# Patient Record
Sex: FEMALE | Race: WHITE | Employment: FULL TIME | URBAN - METROPOLITAN AREA
[De-identification: names, ages, dates, MRNs, and addresses within clinical notes are randomized per-mention and may not be internally consistent; named-entity substitution may affect disease eponyms.]

---

## 2017-04-26 ENCOUNTER — OFFICE VISIT (OUTPATIENT)
Dept: OBGYN CLINIC | Facility: CLINIC | Age: 47
End: 2017-04-26

## 2017-04-26 VITALS
HEIGHT: 64 IN | TEMPERATURE: 99 F | HEART RATE: 68 BPM | WEIGHT: 158 LBS | SYSTOLIC BLOOD PRESSURE: 122 MMHG | RESPIRATION RATE: 12 BRPM | BODY MASS INDEX: 26.98 KG/M2 | DIASTOLIC BLOOD PRESSURE: 74 MMHG

## 2017-04-26 DIAGNOSIS — Z11.51 SCREENING FOR HUMAN PAPILLOMAVIRUS: ICD-10-CM

## 2017-04-26 DIAGNOSIS — Z01.419 ENCOUNTER FOR ANNUAL ROUTINE GYNECOLOGICAL EXAMINATION: Primary | ICD-10-CM

## 2017-04-26 PROCEDURE — 88175 CYTOPATH C/V AUTO FLUID REDO: CPT | Performed by: OBSTETRICS & GYNECOLOGY

## 2017-04-26 PROCEDURE — 87624 HPV HI-RISK TYP POOLED RSLT: CPT | Performed by: OBSTETRICS & GYNECOLOGY

## 2017-04-26 PROCEDURE — 99396 PREV VISIT EST AGE 40-64: CPT | Performed by: OBSTETRICS & GYNECOLOGY

## 2017-04-26 RX ORDER — LEVONORGESTREL AND ETHINYL ESTRADIOL 0.15-0.03
1 KIT ORAL DAILY
Qty: 3 PACKAGE | Refills: 3 | Status: SHIPPED | OUTPATIENT
Start: 2017-04-26 | End: 2017-05-10

## 2017-04-26 NOTE — PROGRESS NOTES
HPI:   Sangita Ngo is a 56year old  who presents for an annual gynecological exam.   Menses: Patient's last menstrual period was 2017 (exact date).  Cycle length: nl days Flow: nl         Current Outpatient Prescriptions:  Levonorgest-Eth Estr hernia  EXTREMITIES: No edema  EXTERNAL GENITALIA: Normal appearance for age, no lesions, normal hair distribution for age  URETHRA: No masses or tenderness, no prolapse  VAGINA: Normal, physiologic discharge, no lesions   CERVIX: Normal, no lesions, no ce gynecological examination  -     ThinPrep PAP Smear; Future  -     [facility] DIGITAL SCRN BILAT W/CAD (CPT=/38801); Future    Screening for human papillomavirus  -     Hpv Dna  High Risk , Thin Prep Collect;  Future    Other orders  -     Levonorgest-Eth Est

## 2017-05-10 RX ORDER — LEVONORGESTREL AND ETHINYL ESTRADIOL 0.15-0.03
KIT ORAL
Qty: 91 TABLET | Refills: 3 | Status: SHIPPED | OUTPATIENT
Start: 2017-05-10 | End: 2020-11-20

## 2018-01-10 ENCOUNTER — TELEPHONE (OUTPATIENT)
Dept: OBGYN CLINIC | Facility: CLINIC | Age: 48
End: 2018-01-10

## 2018-01-10 NOTE — TELEPHONE ENCOUNTER
Patient aware of 's recommendations, will monitor for another month and if no improvement will schedule appointment for evaluation.

## 2018-01-10 NOTE — TELEPHONE ENCOUNTER
Patient called stating she has been on the 3 month pill and had not had a period for that period of time.   However, she has started spotting and wants to know if this is normal?

## 2018-01-10 NOTE — TELEPHONE ENCOUNTER
Patient has been on her current OCP for almost couple years and has been experiencing increase amount of spotting in the past 2 months. Will check with Dr. Travis Wang and call patient back.

## 2018-07-17 RX ORDER — LEVONORGESTREL AND ETHINYL ESTRADIOL 0.15-0.03
1 KIT ORAL DAILY
Qty: 273 TABLET | Refills: 2 | OUTPATIENT
Start: 2018-07-17

## 2018-08-08 RX ORDER — LEVONORGESTREL AND ETHINYL ESTRADIOL 0.15-0.03
1 KIT ORAL DAILY
Qty: 91 TABLET | Refills: 0 | Status: SHIPPED | OUTPATIENT
Start: 2018-08-08 | End: 2018-10-12

## 2018-08-08 NOTE — TELEPHONE ENCOUNTER
Patient called back. Scheduled for a physical 8/17/2018. Last one 4/26/2017. Last mammogram 9/2016. She didn't realize it was that long ago. Asking for refill on ocp.

## 2018-08-17 ENCOUNTER — OFFICE VISIT (OUTPATIENT)
Dept: OBGYN CLINIC | Facility: CLINIC | Age: 48
End: 2018-08-17
Payer: COMMERCIAL

## 2018-08-17 VITALS
WEIGHT: 152 LBS | RESPIRATION RATE: 12 BRPM | HEART RATE: 68 BPM | TEMPERATURE: 98 F | DIASTOLIC BLOOD PRESSURE: 60 MMHG | HEIGHT: 64 IN | BODY MASS INDEX: 25.95 KG/M2 | SYSTOLIC BLOOD PRESSURE: 110 MMHG

## 2018-08-17 DIAGNOSIS — Z12.39 BREAST CANCER SCREENING: ICD-10-CM

## 2018-08-17 DIAGNOSIS — Z01.419 ENCOUNTER FOR ANNUAL ROUTINE GYNECOLOGICAL EXAMINATION: Primary | ICD-10-CM

## 2018-08-17 DIAGNOSIS — Z12.4 SCREENING FOR CERVICAL CANCER: ICD-10-CM

## 2018-08-17 DIAGNOSIS — Z11.51 SCREENING FOR HUMAN PAPILLOMAVIRUS: ICD-10-CM

## 2018-08-17 PROBLEM — G50.0 TRIGEMINAL NEURALGIA: Status: ACTIVE | Noted: 2018-08-17

## 2018-08-17 PROCEDURE — 88175 CYTOPATH C/V AUTO FLUID REDO: CPT | Performed by: OBSTETRICS & GYNECOLOGY

## 2018-08-17 PROCEDURE — 87624 HPV HI-RISK TYP POOLED RSLT: CPT | Performed by: OBSTETRICS & GYNECOLOGY

## 2018-08-17 PROCEDURE — 99396 PREV VISIT EST AGE 40-64: CPT | Performed by: OBSTETRICS & GYNECOLOGY

## 2018-08-17 NOTE — PROGRESS NOTES
HPI:   Ruth Noonan is a 53year old  who presents for an annual gynecological exam.   Menses: Patient's last menstrual period was 2018 (exact date).  Cycle length: 9 light days Flow: light   she is currently on Seasonale and feels that her flar intolerance  ALLERGIC: denies allergy symptoms    EXAM:     VITALS: /60 (BP Location: Right arm, Patient Position: Sitting, Cuff Size: adult)   Pulse 68   Temp 98.2 °F (36.8 °C) (Oral)   Resp 12   Ht 64\"   Wt 152 lb   LMP 08/09/2018 (Exact Date)   B menses. - I highly encouraged pt to be compliant with her yearly screening mammograms to maintain breast health. - I encouraged pt to have yearly annual examinations with her PCP for management of general medical problems.   - I encouraged smoking cessati

## 2018-08-20 LAB — HPV I/H RISK 1 DNA SPEC QL NAA+PROBE: NEGATIVE

## 2018-10-12 RX ORDER — LEVONORGESTREL AND ETHINYL ESTRADIOL 0.15-0.03
KIT ORAL
Qty: 91 TABLET | Refills: 3 | Status: SHIPPED | OUTPATIENT
Start: 2018-10-12 | End: 2019-09-12

## 2018-10-12 NOTE — TELEPHONE ENCOUNTER
PC with patient. Received a request for ocp from [facility]. . Called patient because the prescription is different from what was ordered. She states she has not started the generic . She is hanig jaw pain. Diagnosed with neurologia.  She notices the

## 2018-11-08 ENCOUNTER — HOSPITAL ENCOUNTER (OUTPATIENT)
Dept: MAMMOGRAPHY | Age: 48
Discharge: HOME | End: 2018-11-08
Attending: OBSTETRICS & GYNECOLOGY
Payer: COMMERCIAL

## 2018-11-08 DIAGNOSIS — Z12.39 BREAST CANCER SCREENING: ICD-10-CM

## 2018-11-08 PROCEDURE — 77067 SCR MAMMO BI INCL CAD: CPT | Performed by: OBSTETRICS & GYNECOLOGY

## 2018-11-08 PROCEDURE — 77063 BREAST TOMOSYNTHESIS BI: CPT | Performed by: OBSTETRICS & GYNECOLOGY

## 2018-11-15 ENCOUNTER — HOSPITAL ENCOUNTER (OUTPATIENT)
Dept: ULTRASOUND IMAGING | Age: 48
Discharge: HOME | End: 2018-11-15
Attending: OBSTETRICS & GYNECOLOGY
Payer: COMMERCIAL

## 2018-11-15 ENCOUNTER — HOSPITAL ENCOUNTER (OUTPATIENT)
Dept: MAMMOGRAPHY | Age: 48
Discharge: HOME | End: 2018-11-15
Attending: OBSTETRICS & GYNECOLOGY
Payer: COMMERCIAL

## 2018-11-15 DIAGNOSIS — R92.2 INCONCLUSIVE MAMMOGRAM: ICD-10-CM

## 2018-11-15 PROCEDURE — 77065 DX MAMMO INCL CAD UNI: CPT | Performed by: OBSTETRICS & GYNECOLOGY

## 2018-11-15 PROCEDURE — 76642 ULTRASOUND BREAST LIMITED: CPT | Performed by: OBSTETRICS & GYNECOLOGY

## 2018-11-15 PROCEDURE — 77061 BREAST TOMOSYNTHESIS UNI: CPT | Performed by: OBSTETRICS & GYNECOLOGY

## 2019-09-13 RX ORDER — LEVONORGESTREL AND ETHINYL ESTRADIOL 0.15-0.03
KIT ORAL
Qty: 91 TABLET | Refills: 0 | Status: SHIPPED | OUTPATIENT
Start: 2019-09-13 | End: 2019-10-11

## 2019-09-13 NOTE — TELEPHONE ENCOUNTER
PC with patient. Aware received refill for ocp. Physical is needed. Scheduled for 10/11/2019. Will refill.

## 2019-10-11 ENCOUNTER — OFFICE VISIT (OUTPATIENT)
Dept: OBGYN CLINIC | Facility: CLINIC | Age: 49
End: 2019-10-11
Payer: COMMERCIAL

## 2019-10-11 VITALS
HEIGHT: 64 IN | BODY MASS INDEX: 28 KG/M2 | DIASTOLIC BLOOD PRESSURE: 86 MMHG | WEIGHT: 164 LBS | SYSTOLIC BLOOD PRESSURE: 124 MMHG | HEART RATE: 80 BPM

## 2019-10-11 DIAGNOSIS — Z12.4 SCREENING FOR MALIGNANT NEOPLASM OF CERVIX: ICD-10-CM

## 2019-10-11 DIAGNOSIS — Z01.419 ENCOUNTER FOR ANNUAL ROUTINE GYNECOLOGICAL EXAMINATION: Primary | ICD-10-CM

## 2019-10-11 DIAGNOSIS — Z11.51 SCREENING FOR HUMAN PAPILLOMAVIRUS: ICD-10-CM

## 2019-10-11 DIAGNOSIS — Z12.31 VISIT FOR SCREENING MAMMOGRAM: ICD-10-CM

## 2019-10-11 PROCEDURE — 99396 PREV VISIT EST AGE 40-64: CPT | Performed by: OBSTETRICS & GYNECOLOGY

## 2019-10-11 PROCEDURE — 88175 CYTOPATH C/V AUTO FLUID REDO: CPT | Performed by: OBSTETRICS & GYNECOLOGY

## 2019-10-11 PROCEDURE — 87624 HPV HI-RISK TYP POOLED RSLT: CPT | Performed by: OBSTETRICS & GYNECOLOGY

## 2019-10-11 RX ORDER — GABAPENTIN 300 MG/1
CAPSULE ORAL
Refills: 5 | COMMUNITY
Start: 2019-09-21

## 2019-10-11 RX ORDER — CHLORAL HYDRATE 500 MG
1000 CAPSULE ORAL DAILY
COMMUNITY

## 2019-10-11 RX ORDER — MULTIVITAMIN
TABLET ORAL
COMMUNITY

## 2019-10-11 RX ORDER — LEVONORGESTREL AND ETHINYL ESTRADIOL 0.15-0.03
KIT ORAL
Qty: 91 TABLET | Refills: 3 | Status: SHIPPED | OUTPATIENT
Start: 2019-10-11 | End: 2020-08-11

## 2019-10-11 RX ORDER — OXCARBAZEPINE 150 MG/1
450 TABLET, FILM COATED ORAL
COMMUNITY
Start: 2019-10-10 | End: 2020-11-20

## 2019-10-11 NOTE — PROGRESS NOTES
HPI:   Natividad Robert is a 51year old  who presents for an annual gynecological exam.   Menses: No LMP recorded. (Menstrual status: Continuous Pill). Cycle length: none days Flow: none   she is taking Seasonale on a continuous basis.   Has trigemina back pain, muscle or joint aches  NEUROLOGIC: denies headaches  PSYCHIATRIC: denies depression or anxiety, mood is good  HEMATOLOGIC: denies history of anemia  ENDOCRINE: denies thyroid history, cold/heat intolerance  ALLERGIC: denies allergy symptoms    E breast exams; pt was told to perform these in the shower; she was instructed to perform these 7-10 days after her menses. - I highly encouraged pt to be compliant with her yearly screening mammograms to maintain breast health.   - I encouraged pt to have y

## 2019-11-15 ENCOUNTER — HOSPITAL ENCOUNTER (OUTPATIENT)
Dept: MAMMOGRAPHY | Age: 49
Discharge: HOME | End: 2019-11-15
Attending: OBSTETRICS & GYNECOLOGY
Payer: COMMERCIAL

## 2019-11-15 DIAGNOSIS — Z12.31 VISIT FOR SCREENING MAMMOGRAM: ICD-10-CM

## 2019-11-15 PROCEDURE — 77067 SCR MAMMO BI INCL CAD: CPT | Performed by: OBSTETRICS & GYNECOLOGY

## 2019-11-15 PROCEDURE — 77063 BREAST TOMOSYNTHESIS BI: CPT | Performed by: OBSTETRICS & GYNECOLOGY

## 2019-12-16 RX ORDER — LEVONORGESTREL AND ETHINYL ESTRADIOL 0.15-0.03
KIT ORAL
Qty: 91 TABLET | Refills: 0 | OUTPATIENT
Start: 2019-12-16

## 2020-08-11 RX ORDER — LEVONORGESTREL AND ETHINYL ESTRADIOL 0.15-0.03
KIT ORAL
Qty: 91 TABLET | Refills: 0 | Status: SHIPPED | OUTPATIENT
Start: 2020-08-11 | End: 2020-11-02

## 2020-11-02 RX ORDER — LEVONORGESTREL AND ETHINYL ESTRADIOL 0.15-0.03
KIT ORAL
Qty: 91 TABLET | Refills: 0 | Status: SHIPPED | OUTPATIENT
Start: 2020-11-02 | End: 2020-11-20

## 2020-11-20 ENCOUNTER — OFFICE VISIT (OUTPATIENT)
Dept: OBGYN CLINIC | Facility: CLINIC | Age: 50
End: 2020-11-20
Payer: COMMERCIAL

## 2020-11-20 VITALS
WEIGHT: 173 LBS | RESPIRATION RATE: 16 BRPM | SYSTOLIC BLOOD PRESSURE: 130 MMHG | TEMPERATURE: 98 F | HEART RATE: 68 BPM | BODY MASS INDEX: 29.53 KG/M2 | DIASTOLIC BLOOD PRESSURE: 76 MMHG | HEIGHT: 64 IN

## 2020-11-20 DIAGNOSIS — Z01.419 ENCOUNTER FOR ANNUAL ROUTINE GYNECOLOGICAL EXAMINATION: Primary | ICD-10-CM

## 2020-11-20 DIAGNOSIS — Z11.51 SCREENING FOR HUMAN PAPILLOMAVIRUS: ICD-10-CM

## 2020-11-20 DIAGNOSIS — Z12.31 BREAST CANCER SCREENING BY MAMMOGRAM: ICD-10-CM

## 2020-11-20 DIAGNOSIS — Z12.4 SCREENING FOR MALIGNANT NEOPLASM OF CERVIX: ICD-10-CM

## 2020-11-20 PROCEDURE — 3078F DIAST BP <80 MM HG: CPT | Performed by: OBSTETRICS & GYNECOLOGY

## 2020-11-20 PROCEDURE — 3075F SYST BP GE 130 - 139MM HG: CPT | Performed by: OBSTETRICS & GYNECOLOGY

## 2020-11-20 PROCEDURE — 3008F BODY MASS INDEX DOCD: CPT | Performed by: OBSTETRICS & GYNECOLOGY

## 2020-11-20 PROCEDURE — 87624 HPV HI-RISK TYP POOLED RSLT: CPT | Performed by: OBSTETRICS & GYNECOLOGY

## 2020-11-20 PROCEDURE — 99396 PREV VISIT EST AGE 40-64: CPT | Performed by: OBSTETRICS & GYNECOLOGY

## 2020-11-20 PROCEDURE — 88175 CYTOPATH C/V AUTO FLUID REDO: CPT | Performed by: OBSTETRICS & GYNECOLOGY

## 2020-11-20 RX ORDER — OXCARBAZEPINE 300 MG/1
300 TABLET ORAL 2 TIMES DAILY
COMMUNITY

## 2020-11-20 RX ORDER — OXCARBAZEPINE 600 MG/1
600 TABLET ORAL 2 TIMES DAILY
COMMUNITY

## 2020-11-20 RX ORDER — LEVONORGESTREL AND ETHINYL ESTRADIOL 0.15-0.03
1 KIT ORAL DAILY
Qty: 91 TABLET | Refills: 3 | Status: SHIPPED | OUTPATIENT
Start: 2020-11-20 | End: 2021-12-02

## 2020-11-20 NOTE — PROGRESS NOTES
HPI:   Alexandre Valenzuela is a 53year old  who presents for an annual gynecological exam.   Menses: No LMP recorded (lmp unknown). (Menstrual status: Continuous Pill).  Cycle length: 0 days Flow:  none   taking continuous Seasonale     Current Outpatien aches  NEUROLOGIC: denies headaches  PSYCHIATRIC: denies depression or anxiety, mood is good  HEMATOLOGIC: denies history of anemia  ENDOCRINE: denies thyroid history, cold/heat intolerance  ALLERGIC: denies allergy symptoms    EXAM:     VITALS: /76 women's vitamin that has both calcium and vitamin D.  - I encouraged monthly self breast exams; pt was told to perform these in the shower; she was instructed to perform these 7-10 days after her menses.   - I highly encouraged pt to be compliant with her y

## 2020-11-25 NOTE — PROGRESS NOTES
Rigoberto Baez,    Your pap smear was normal/negative and negative for HPV. Please contact the office if you have any questions or concerns.     Janis DANIELN

## 2021-01-29 ENCOUNTER — HOSPITAL ENCOUNTER (OUTPATIENT)
Dept: MAMMOGRAPHY | Age: 51
Discharge: HOME | End: 2021-01-29
Attending: OBSTETRICS & GYNECOLOGY
Payer: COMMERCIAL

## 2021-01-29 DIAGNOSIS — Z12.31 BREAST CANCER SCREENING BY MAMMOGRAM: ICD-10-CM

## 2021-01-29 PROCEDURE — 77063 BREAST TOMOSYNTHESIS BI: CPT | Performed by: OBSTETRICS & GYNECOLOGY

## 2021-01-29 PROCEDURE — 77067 SCR MAMMO BI INCL CAD: CPT | Performed by: OBSTETRICS & GYNECOLOGY

## 2021-12-02 RX ORDER — LEVONORGESTREL AND ETHINYL ESTRADIOL 0.15-0.03
1 KIT ORAL DAILY
Qty: 91 TABLET | Refills: 0 | Status: SHIPPED | OUTPATIENT
Start: 2021-12-02 | End: 2022-03-04

## 2021-12-17 ENCOUNTER — OFFICE VISIT (OUTPATIENT)
Dept: OBGYN CLINIC | Facility: CLINIC | Age: 51
End: 2021-12-17
Payer: COMMERCIAL

## 2021-12-17 VITALS
WEIGHT: 169 LBS | TEMPERATURE: 99 F | HEART RATE: 72 BPM | DIASTOLIC BLOOD PRESSURE: 90 MMHG | SYSTOLIC BLOOD PRESSURE: 157 MMHG | HEIGHT: 64 IN | BODY MASS INDEX: 28.85 KG/M2

## 2021-12-17 DIAGNOSIS — Z11.51 SCREENING FOR HUMAN PAPILLOMAVIRUS: ICD-10-CM

## 2021-12-17 DIAGNOSIS — Z12.4 SCREENING FOR MALIGNANT NEOPLASM OF CERVIX: ICD-10-CM

## 2021-12-17 DIAGNOSIS — Z01.419 ENCOUNTER FOR ANNUAL ROUTINE GYNECOLOGICAL EXAMINATION: Primary | ICD-10-CM

## 2021-12-17 DIAGNOSIS — Z12.31 BREAST CANCER SCREENING BY MAMMOGRAM: ICD-10-CM

## 2021-12-17 PROCEDURE — 88175 CYTOPATH C/V AUTO FLUID REDO: CPT | Performed by: OBSTETRICS & GYNECOLOGY

## 2021-12-17 PROCEDURE — 3077F SYST BP >= 140 MM HG: CPT | Performed by: OBSTETRICS & GYNECOLOGY

## 2021-12-17 PROCEDURE — 99396 PREV VISIT EST AGE 40-64: CPT | Performed by: OBSTETRICS & GYNECOLOGY

## 2021-12-17 PROCEDURE — 87624 HPV HI-RISK TYP POOLED RSLT: CPT | Performed by: OBSTETRICS & GYNECOLOGY

## 2021-12-17 PROCEDURE — 3080F DIAST BP >= 90 MM HG: CPT | Performed by: OBSTETRICS & GYNECOLOGY

## 2021-12-17 PROCEDURE — 3008F BODY MASS INDEX DOCD: CPT | Performed by: OBSTETRICS & GYNECOLOGY

## 2021-12-17 RX ORDER — ACETAMINOPHEN AND CODEINE PHOSPHATE 120; 12 MG/5ML; MG/5ML
0.35 SOLUTION ORAL DAILY
Qty: 84 TABLET | Refills: 3 | Status: SHIPPED | OUTPATIENT
Start: 2021-12-17 | End: 2022-12-17

## 2021-12-17 NOTE — PROGRESS NOTES
HPI:   Ronni Sy is a 49year old  who presents for an annual gynecological exam.   Menses: No LMP recorded (lmp unknown). (Menstrual status: Continuous Pill).  Menopause: perimenopausal  Taking OCP continuously to avoid menses due to trigeminal blurred vision, visual changes  CARDIOVASCULAR: denies chest pain   LUNGS:  denies shortness of breath   GI: denies abdominal pain,denies heartburn  : denies dysuria, vaginal discharge or itching, periods regular   MUSCULOSKELETAL: denies back pain, musc foods.  ·  I encouraged pt to incorporate milk products into her diet to ensure adequate calcium intake, specifically with yogurt, milk, and cheeses.   · I encouraged pt to maintain taking a daily women's vitamin that has both calcium and vitamin D.  ·  I e

## 2021-12-17 NOTE — PROGRESS NOTES
Pt here for pe/pap.   LMP:Does not get a period due to continuous us of OCP's  Last pap:11/20/21 negative  Last mammogram;1/29/21 negative  Birth control:OCP' s needs refill

## 2022-02-11 ENCOUNTER — HOSPITAL ENCOUNTER (OUTPATIENT)
Dept: MAMMOGRAPHY | Age: 52
Discharge: HOME | End: 2022-02-11
Attending: OBSTETRICS & GYNECOLOGY
Payer: COMMERCIAL

## 2022-02-11 DIAGNOSIS — Z12.31 BREAST CANCER SCREENING BY MAMMOGRAM: ICD-10-CM

## 2022-02-11 PROCEDURE — 77067 SCR MAMMO BI INCL CAD: CPT | Performed by: OBSTETRICS & GYNECOLOGY

## 2022-02-11 PROCEDURE — 77063 BREAST TOMOSYNTHESIS BI: CPT | Performed by: OBSTETRICS & GYNECOLOGY

## 2022-03-04 ENCOUNTER — TELEPHONE (OUTPATIENT)
Dept: OBGYN CLINIC | Facility: CLINIC | Age: 52
End: 2022-03-04

## 2022-03-04 RX ORDER — LEVONORGESTREL AND ETHINYL ESTRADIOL 0.15-0.03
1 KIT ORAL DAILY
Qty: 91 TABLET | Refills: 1 | Status: SHIPPED | OUTPATIENT
Start: 2022-03-04 | End: 2022-03-04

## 2022-03-04 RX ORDER — LEVONORGESTREL AND ETHINYL ESTRADIOL 0.15-0.03
1 KIT ORAL DAILY
Qty: 91 TABLET | Refills: 1 | Status: SHIPPED | OUTPATIENT
Start: 2022-03-04

## 2023-02-10 ENCOUNTER — OFFICE VISIT (OUTPATIENT)
Facility: CLINIC | Age: 53
End: 2023-02-10
Payer: COMMERCIAL

## 2023-02-10 VITALS
HEART RATE: 89 BPM | BODY MASS INDEX: 26.67 KG/M2 | SYSTOLIC BLOOD PRESSURE: 134 MMHG | WEIGHT: 156.19 LBS | DIASTOLIC BLOOD PRESSURE: 78 MMHG | HEIGHT: 64 IN

## 2023-02-10 DIAGNOSIS — Z01.419 ENCOUNTER FOR ANNUAL ROUTINE GYNECOLOGICAL EXAMINATION: Primary | ICD-10-CM

## 2023-02-21 NOTE — TELEPHONE ENCOUNTER
Message left to call back. Per visit note from 2/10/23 pt was going to try to go off BCP's & observe if still having periods. Checking to see if a refill is needed.

## 2023-02-27 RX ORDER — ACETAMINOPHEN AND CODEINE PHOSPHATE 120; 12 MG/5ML; MG/5ML
SOLUTION ORAL
Qty: 84 TABLET | Refills: 0 | OUTPATIENT
Start: 2023-02-27

## 2023-04-28 ENCOUNTER — HOSPITAL ENCOUNTER (OUTPATIENT)
Dept: MAMMOGRAPHY | Facility: HOSPITAL | Age: 53
Discharge: HOME | End: 2023-04-28
Attending: STUDENT IN AN ORGANIZED HEALTH CARE EDUCATION/TRAINING PROGRAM
Payer: COMMERCIAL

## 2023-04-28 DIAGNOSIS — Z01.419 ENCOUNTER FOR ANNUAL ROUTINE GYNECOLOGICAL EXAMINATION: ICD-10-CM

## 2023-04-28 PROCEDURE — 77067 SCR MAMMO BI INCL CAD: CPT | Performed by: STUDENT IN AN ORGANIZED HEALTH CARE EDUCATION/TRAINING PROGRAM

## 2023-04-28 PROCEDURE — 77063 BREAST TOMOSYNTHESIS BI: CPT | Performed by: STUDENT IN AN ORGANIZED HEALTH CARE EDUCATION/TRAINING PROGRAM

## 2023-12-21 ENCOUNTER — TELEPHONE (OUTPATIENT)
Facility: CLINIC | Age: 53
End: 2023-12-21

## 2024-02-29 ENCOUNTER — OFFICE VISIT (OUTPATIENT)
Dept: OBGYN CLINIC | Facility: CLINIC | Age: 54
End: 2024-02-29
Payer: COMMERCIAL

## 2024-02-29 VITALS
HEIGHT: 64 IN | BODY MASS INDEX: 28.53 KG/M2 | HEART RATE: 91 BPM | DIASTOLIC BLOOD PRESSURE: 78 MMHG | WEIGHT: 167.13 LBS | SYSTOLIC BLOOD PRESSURE: 116 MMHG

## 2024-02-29 DIAGNOSIS — Z12.31 BREAST CANCER SCREENING BY MAMMOGRAM: ICD-10-CM

## 2024-02-29 DIAGNOSIS — Z01.419 ENCOUNTER FOR ANNUAL ROUTINE GYNECOLOGICAL EXAMINATION: Primary | ICD-10-CM

## 2024-02-29 PROCEDURE — 99396 PREV VISIT EST AGE 40-64: CPT | Performed by: STUDENT IN AN ORGANIZED HEALTH CARE EDUCATION/TRAINING PROGRAM

## 2024-02-29 NOTE — PROGRESS NOTES
[facility]  Obstetrics and Gynecology   History & Physical    Chief complaint:   Chief Complaint   Patient presents with    Wellness Visit     Annual Exam         HPI: Azucena Roberts is a 53 year old  with Patient's last menstrual period was 2022 (approximate).      Here for annual GYN exam  She had been on continuous OCPs for years in order to help prevent trigeminal neuralgia pain which was thought to be similar to menstrual migraines, helped. Once pt turned 51, Dr Ross transitioned pt to progestin-only pills. Last year, since pt reported had not actually had a period in a long time, we discussed discontinuing POPs to see if was in menopause and if really necessary anymore  Pt went off pills, didn't get any periods (has been >1y without a period now)  Hadn't had a flareup of neuralgia in 2 years, then just this week started feeling the pain coming back. Also noticed pimples, wondering if having some hormonal fluctuation still    No hot flashes, maybe night sweats? Feels hot at night but her cats will sleep on her so  may be that    Remote hx abnormal pap, recently all have been normal           PMHx/Surghx reviewed, below. Of note: no changes, uncomplicated  Famhx: mom had pancreatic cancer, pt actually did hereditary cancer screening (genetic test) and told was all negative  Dad had prostate cancer    PCP: PHYSICIAN NONSTAFF    Review of Systems:  Constitutional:  Denies fatigue, hot flashes  Eyes:  denies blurred or double vision  Cardiovascular:  denies chest pain or palpitations  Respiratory:  denies shortness of breath  Gastrointestinal:  denies heartburn, abdominal pain, diarrhea or constipation  Genitourinary:  denies dysuria, incontinence, abnormal vaginal discharge, vaginal itching  Musculoskeletal:  denies back pain.  Skin/Breast:  Denies any breast pain, lumps, or discharge.   Neurological:  denies headaches, extremity weakness or numbness.  Psychiatric: denies depression or  anxiety.  Endocrine:   denies excessive thirst or urination.  Heme/Lymph:  denies history of anemia, easy bruising or bleeding.    OB History:  OB History    Para Term  AB Living   3 2 1   1 2   SAB IAB Ectopic Multiple Live Births           2      # Outcome Date GA Lbr Sammy/2nd Weight Sex Delivery Anes PTL Lv   3 Para 08/10/05   8 lb 9 oz (3.884 kg) F NORMAL SPONT   GINETTE   2 Term 11/10/02 38w0d  7 lb 14 oz (3.572 kg) M NORMAL SPONT EPI  GINETTE   1 AB                Meds:  Current Outpatient Medications on File Prior to Visit   Medication Sig Dispense Refill    OXcarbazepine ER (OXTELLAR XR) 600 MG Oral Tablet 24 Hr Take 600 mg by mouth 2 (two) times a day.      gabapentin 300 MG Oral Cap TAKE 1 CAP BY MOUTH 3 TIMES PER DAY. MAY INCREASE UP TO 2 CAPS BY MOUTH 3 TIMES A DAY AS TOLERATED.  5    Multiple Vitamin (MULTI-VITAMIN DAILY) Oral Tab Take by mouth.      omega-3 fatty acids 1000 MG Oral Cap Take 1,000 mg by mouth daily.       No current facility-administered medications on file prior to visit.       All:  No Known Allergies    PMH:  Past Medical History:   Diagnosis Date    Pap smear for cervical cancer screening 3/21/16, 1/16/15,10/15/13    Negative results    Trigeminal neuralgia        PSH:  History reviewed. No pertinent surgical history.    Social History:  Social History     Socioeconomic History    Marital status:      Spouse name: Not on file    Number of children: Not on file    Years of education: Not on file    Highest education level: Not on file   Occupational History    Not on file   Tobacco Use    Smoking status: Never    Smokeless tobacco: Never   Vaping Use    Vaping Use: Never used   Substance and Sexual Activity    Alcohol use: Yes     Comment: Rarely    Drug use: No    Sexual activity: Yes     Partners: Male   Other Topics Concern     Service Not Asked    Blood Transfusions Not Asked    Caffeine Concern Yes    Occupational Exposure Not Asked    Hobby Hazards Not Asked     Sleep Concern Not Asked    Stress Concern Not Asked    Weight Concern Not Asked    Special Diet Not Asked    Back Care Not Asked    Exercise Yes    Bike Helmet Not Asked    Seat Belt Yes    Self-Exams No   Social History Narrative    Not on file     Social Determinants of Health     Financial Resource Strain: Not on file   Food Insecurity: Not on file   Transportation Needs: Not on file   Physical Activity: Not on file   Stress: Not on file   Social Connections: Not on file   Housing Stability: Not on file        Family History:  Family History   Problem Relation Age of Onset    Pancreatic Cancer Mother 78    Heart Disorder Mother     Stroke Father     Breast Cancer Maternal Grandmother 50       PHYSICAL EXAM:     Vitals:    02/29/24 1510   BP: 116/78   Pulse: 91   Weight: 167 lb 1.7 oz (75.8 kg)   Height: 64\"       Body mass index is 28.68 kg/m².      Constitutional: well developed, well nourished  Head/Face: normocephalic  Neck/Thyroid: thyroid symmetric, no thyromegaly, no nodules, no adenopathy  Lymphatic: no abnormal supraclavicular or axillary adenopathy is noted  Breast: normal without palpable masses, tenderness, asymmetry, nipple discharge, nipple retraction or skin changes  Abdomen:  soft, nontender, nondistended, no masses  Skin/Hair: no unusual rashes or bruises  Extremities: no edema, no cyanosis  Psychiatric:  Oriented to time, place, person and situation. Appropriate mood and affect    Pelvic Exam:  External Genitalia: normal appearance, hair distribution, and no lesions  Urethral Meatus:  normal in size, location, without lesions and prolapse  Bladder:  No fullness, masses or tenderness  Vagina:  Normal appearance without lesions, no abnormal discharge  Cervix:  Normal without tenderness on motion  Uterus: normal in size, contour, position, mobility, without tenderness  Adnexa: normal without masses or tenderness  Perineum: normal  Anus: no hemorrhoids     Assessment & Plan:     Azucena Roberts  is a 53 year old      Diagnoses and all orders for this visit:    Encounter for annual routine gynecological examination  -     [facility] THAO 2D+3D SCREENING BILAT (CPT=77067/52534); Future    Breast cancer screening by mammogram  -     [facility] THAO 2D+3D SCREENING BILAT (CPT=77067/97829); Future       Normal breast and pelvic exam    Re; trigeminal neuralgia flares, whether may improve with HRT or going back on norethindrone? Pt will observe, see how frequently has flare ups. Not at the point yet of doing HRT or norethindrone  Pt also feels like has to use bathroom more frequently, but no nocturia. Not at the point of urogyn referral yet    Healthcare maintenance:  Pap: 2021 NILM HPV neg - repeat next year   Mammogram ordered  Colonoscopy - per PCP, was doing cologard  DEXA, age 66 (earlier if postmenopausal with personal/fam hx fragility fx, underweight, smoking/excessive ETOH, steroids, RA)      Lakisha Rivas MD  [facility] - OBGYN

## 2024-04-19 ENCOUNTER — HOSPITAL ENCOUNTER (OUTPATIENT)
Dept: MAMMOGRAPHY | Age: 54
Discharge: HOME | End: 2024-04-19
Attending: STUDENT IN AN ORGANIZED HEALTH CARE EDUCATION/TRAINING PROGRAM
Payer: COMMERCIAL

## 2024-04-19 DIAGNOSIS — Z01.419 ENCOUNTER FOR ANNUAL ROUTINE GYNECOLOGICAL EXAMINATION: ICD-10-CM

## 2024-04-19 DIAGNOSIS — Z12.31 BREAST CANCER SCREENING BY MAMMOGRAM: ICD-10-CM

## 2024-04-19 PROCEDURE — 77063 BREAST TOMOSYNTHESIS BI: CPT | Performed by: STUDENT IN AN ORGANIZED HEALTH CARE EDUCATION/TRAINING PROGRAM

## 2024-04-19 PROCEDURE — 77067 SCR MAMMO BI INCL CAD: CPT | Performed by: STUDENT IN AN ORGANIZED HEALTH CARE EDUCATION/TRAINING PROGRAM

## 2025-05-02 ENCOUNTER — OFFICE VISIT (OUTPATIENT)
Facility: CLINIC | Age: 55
End: 2025-05-02
Payer: COMMERCIAL

## 2025-05-02 VITALS
HEIGHT: 64 IN | SYSTOLIC BLOOD PRESSURE: 118 MMHG | DIASTOLIC BLOOD PRESSURE: 66 MMHG | HEART RATE: 79 BPM | WEIGHT: 167 LBS | BODY MASS INDEX: 28.51 KG/M2

## 2025-05-02 DIAGNOSIS — N63.13 MASS OF LOWER OUTER QUADRANT OF RIGHT BREAST: ICD-10-CM

## 2025-05-02 DIAGNOSIS — Z12.31 BREAST CANCER SCREENING BY MAMMOGRAM: ICD-10-CM

## 2025-05-02 DIAGNOSIS — Z01.419 ENCOUNTER FOR ANNUAL ROUTINE GYNECOLOGICAL EXAMINATION: Primary | ICD-10-CM

## 2025-05-02 DIAGNOSIS — Z12.4 ENCOUNTER FOR PAPANICOLAOU SMEAR FOR CERVICAL CANCER SCREENING: ICD-10-CM

## 2025-05-02 PROCEDURE — 87624 HPV HI-RISK TYP POOLED RSLT: CPT | Performed by: STUDENT IN AN ORGANIZED HEALTH CARE EDUCATION/TRAINING PROGRAM

## 2025-05-02 PROCEDURE — 88175 CYTOPATH C/V AUTO FLUID REDO: CPT | Performed by: STUDENT IN AN ORGANIZED HEALTH CARE EDUCATION/TRAINING PROGRAM

## 2025-05-02 PROCEDURE — 99459 PELVIC EXAMINATION: CPT | Performed by: STUDENT IN AN ORGANIZED HEALTH CARE EDUCATION/TRAINING PROGRAM

## 2025-05-02 PROCEDURE — 99396 PREV VISIT EST AGE 40-64: CPT | Performed by: STUDENT IN AN ORGANIZED HEALTH CARE EDUCATION/TRAINING PROGRAM

## 2025-05-02 NOTE — PROGRESS NOTES
[facility]  Obstetrics and Gynecology   History & Physical    Chief complaint:   Chief Complaint   Patient presents with    Wellness Visit     WWE  - Preventative/Wellness form signed by patient.         HPI: Azucena Roberts is a 55 year old  with Patient's last menstrual period was 2022 (approximate).      Here for annual GYN exam  Menopausal - since going off progestin only pills 2y ago, periods never came back  Some mild occasional hot flashes, manageable, not bad  No postmenopausal bleeding      Remote hx abnormal pap, recently all have been normal           PMHx/Surghx reviewed, below. Of note: trigeminal neuralgia  Famhx: mom had pancreatic cancer, pt actually did hereditary cancer screening (genetic test) and told was all negative  Dad had prostate cancer    PCP: PHYSICIAN NONSTAFF    Review of Systems:  Constitutional:  Denies fatigue, +mild hot flashes  Eyes:  denies blurred or double vision  Cardiovascular:  denies chest pain or palpitations  Respiratory:  denies shortness of breath  Gastrointestinal:  denies heartburn, abdominal pain, diarrhea or constipation  Genitourinary:  denies dysuria, incontinence, abnormal vaginal discharge, vaginal itching  Musculoskeletal:  denies back pain.  Skin/Breast:  Denies any breast pain, lumps, or discharge.   Neurological:  denies headaches, extremity weakness or numbness.  Psychiatric: denies depression or anxiety.  Endocrine:   denies excessive thirst or urination.  Heme/Lymph:  denies history of anemia, easy bruising or bleeding.    OB History:  OB History    Para Term  AB Living   3 2 1  1 2   SAB IAB Ectopic Multiple Live Births       2      # Outcome Date GA Lbr Sammy/2nd Weight Sex Type Anes PTL Lv   3 Para 08/10/05   8 lb 9 oz (3.884 kg) F Vag-Spont   GINETTE   2 Term 11/10/02 38w0d  7 lb 14 oz (3.572 kg) M Vag-Spont EPI  GINETTE   1 AB                Meds:  Current Outpatient Medications on File Prior to Visit   Medication Sig  Dispense Refill    OXcarbazepine ER (OXTELLAR XR) 600 MG Oral Tablet 24 Hr Take 600 mg by mouth 2 (two) times a day.      gabapentin 300 MG Oral Cap TAKE 1 CAP BY MOUTH 3 TIMES PER DAY. MAY INCREASE UP TO 2 CAPS BY MOUTH 3 TIMES A DAY AS TOLERATED.  5    Multiple Vitamin (MULTI-VITAMIN DAILY) Oral Tab Take by mouth.      omega-3 fatty acids 1000 MG Oral Cap Take 1,000 mg by mouth daily.       No current facility-administered medications on file prior to visit.       All:  No Known Allergies    PMH:  Past Medical History:    Amenorrhea    Pap smear for cervical cancer screening    Negative results    Trigeminal neuralgia       PSH:  History reviewed. No pertinent surgical history.    Social History:  Social History     Socioeconomic History    Marital status:      Spouse name: Not on file    Number of children: Not on file    Years of education: Not on file    Highest education level: Not on file   Occupational History    Not on file   Tobacco Use    Smoking status: Never    Smokeless tobacco: Never   Vaping Use    Vaping status: Never Used   Substance and Sexual Activity    Alcohol use: Yes     Comment: Rarely    Drug use: No    Sexual activity: Yes     Partners: Male   Other Topics Concern     Service Not Asked    Blood Transfusions Not Asked    Caffeine Concern No    Occupational Exposure Not Asked    Hobby Hazards Not Asked    Sleep Concern Not Asked    Stress Concern No    Weight Concern Yes    Special Diet Yes     Comment: Weight Watchers    Back Care Not Asked    Exercise Yes     Comment: Strength training & walking    Bike Helmet Not Asked    Seat Belt Yes    Self-Exams No   Social History Narrative    Not on file     Social Drivers of Health     Food Insecurity: Not on file   Transportation Needs: Not on file   Stress: Not on file   Housing Stability: Not on file        Family History:  Family History   Problem Relation Age of Onset    Stroke Father     Pancreatic Cancer Mother 78    Heart  Disorder Mother         Heart transplant    No Known Problems Daughter     No Known Problems Son     Breast Cancer Maternal Grandmother 50    Bone Cancer Maternal Grandmother     Prostate Cancer Paternal Uncle        PHYSICAL EXAM:     Vitals:    25 0936   BP: 118/66   Pulse: 79   Weight: 167 lb (75.8 kg)   Height: 64\"       Body mass index is 28.67 kg/m².      Constitutional: well developed, well nourished  Head/Face: normocephalic  Breast: R breast - most inferior aspect feels to be small nodule, mobile, nontender  otherwise normal without palpable masses, tenderness, asymmetry, nipple discharge, nipple retraction or skin changes  Abdomen:  soft, nontender, nondistended, no masses  Skin/Hair: no unusual rashes or bruises  Extremities: no edema, no cyanosis  Psychiatric:  Oriented to time, place, person and situation. Appropriate mood and affect    Pelvic Exam:  External Genitalia: normal appearance, hair distribution, and no lesions  Urethral Meatus:  normal in size, location, without lesions and prolapse  Bladder:  No fullness, masses or tenderness  Vagina:  Normal appearance without lesions, no abnormal discharge  Cervix:  Normal without tenderness on motion  Uterus: normal in size, contour, position, mobility, without tenderness  Adnexa: normal without masses or tenderness  Perineum: normal      Assessment & Plan:     Azucena Roberts is a 55 year old      Diagnoses and all orders for this visit:    Encounter for annual routine gynecological examination    Breast cancer screening by mammogram  -     Cancel: [facility] THAO 2D+3D SCREENING BILAT (CPT=77067/17741); Future  -     [facility] THAO 2D+3D SCREENING LEFT (CPT=77067-52/84061); Future    Mass of lower outer quadrant of right breast  -     [facility] THAO 2D+3D DIAGNOSTIC RAFITA RIGHT (CPT=77065/65633); Future    Encounter for Papanicolaou smear for cervical cancer screening  -     ThinPrep PAP Smear; Future  -     Hpv Dna  High Risk , Thin Prep Collect;  Future         Can feel possible lump at inferior aspect of R breast - maybe just dense tissue - will check diagnostic mammo  Otherwise normal breast and pelvic exam      Healthcare maintenance:  Pap: done  Mammogram ordered  Colonoscopy - per PCP, was doing cologard  DEXA, age 66 (earlier if postmenopausal with personal/fam hx fragility fx, underweight, smoking/excessive ETOH, steroids, RA)      Lakisha Rivas MD  [facility] - OBGYN

## 2025-05-05 LAB — HPV E6+E7 MRNA CVX QL NAA+PROBE: NEGATIVE

## 2025-05-08 ENCOUNTER — PATIENT MESSAGE (OUTPATIENT)
Dept: OBGYN CLINIC | Facility: CLINIC | Age: 55
End: 2025-05-08

## 2025-05-08 DIAGNOSIS — Z12.31 BREAST CANCER SCREENING BY MAMMOGRAM: Primary | ICD-10-CM

## 2025-05-09 ENCOUNTER — HOSPITAL ENCOUNTER (OUTPATIENT)
Dept: MAMMOGRAPHY | Age: 55
Discharge: HOME | End: 2025-05-09
Attending: STUDENT IN AN ORGANIZED HEALTH CARE EDUCATION/TRAINING PROGRAM
Payer: COMMERCIAL

## 2025-05-09 DIAGNOSIS — Z12.31 BREAST CANCER SCREENING BY MAMMOGRAM: ICD-10-CM

## 2025-05-09 PROCEDURE — 77067 SCR MAMMO BI INCL CAD: CPT | Performed by: STUDENT IN AN ORGANIZED HEALTH CARE EDUCATION/TRAINING PROGRAM

## 2025-05-09 PROCEDURE — 77063 BREAST TOMOSYNTHESIS BI: CPT | Performed by: STUDENT IN AN ORGANIZED HEALTH CARE EDUCATION/TRAINING PROGRAM

## 2025-05-13 ENCOUNTER — TELEPHONE (OUTPATIENT)
Facility: CLINIC | Age: 55
End: 2025-05-13

## 2025-05-13 ENCOUNTER — PATIENT MESSAGE (OUTPATIENT)
Facility: CLINIC | Age: 55
End: 2025-05-13

## 2025-05-13 NOTE — TELEPHONE ENCOUNTER
Awaiting for return call from breast navigator for recommendations:  mammogram results:  CONCLUSION:    Indeterminate right breast finding(s) for which further evaluation is recommended.

## 2025-05-13 NOTE — TELEPHONE ENCOUNTER
RECOMMENDATIONS:    RETURN TO ANNUAL SCREENING MAMMMOGRAM IN SIX MONTHS. RIGHT BREAST        Left message for breast care navigator to clarify what order needs to be placed.     - Received call from breast care navigator pt to schedule appointment with   High risk breast assessment clinic [phone] for family hx of cancer.     Left msg for pt to call office.

## 2025-05-13 NOTE — TELEPHONE ENCOUNTER
Patient aware addendum to mammogram results was updated. Patient will call and schedule additional views. Scheduling info provided.

## 2025-05-13 NOTE — TELEPHONE ENCOUNTER
Per mammogram results:  CONCLUSION:    Indeterminate right breast finding(s) for which further evaluation is recommended.      No mammographic evidence malignancy in the left breast.      BI-RADS CATEGORY:    DIAGNOSTIC CATEGORY 0 - INCOMPLETE ASSESSMENT: NEED ADDITIONAL IMAGING EVALUATION.       RECOMMENDATIONS:    RETURN TO ANNUAL SCREENING MAMMMOGRAM IN SIX MONTHS. RIGHT BREAST       Left message for breast care navigator to clarify what order needs to be placed.

## 2025-06-03 ENCOUNTER — HOSPITAL ENCOUNTER (OUTPATIENT)
Dept: MAMMOGRAPHY | Facility: HOSPITAL | Age: 55
Discharge: HOME | End: 2025-06-03
Attending: STUDENT IN AN ORGANIZED HEALTH CARE EDUCATION/TRAINING PROGRAM
Payer: COMMERCIAL

## 2025-06-03 DIAGNOSIS — R92.2 INCONCLUSIVE MAMMOGRAM: ICD-10-CM

## 2025-06-03 PROCEDURE — 77065 DX MAMMO INCL CAD UNI: CPT | Performed by: STUDENT IN AN ORGANIZED HEALTH CARE EDUCATION/TRAINING PROGRAM

## 2025-06-03 PROCEDURE — 76642 ULTRASOUND BREAST LIMITED: CPT | Performed by: STUDENT IN AN ORGANIZED HEALTH CARE EDUCATION/TRAINING PROGRAM

## 2025-06-03 PROCEDURE — 77061 BREAST TOMOSYNTHESIS UNI: CPT | Performed by: STUDENT IN AN ORGANIZED HEALTH CARE EDUCATION/TRAINING PROGRAM

## (undated) NOTE — MR AVS SNAPSHOT
After Visit Summary   4/26/2017    Tina Hansen    MRN: [id number]           Visit Information        Provider Department Dept Phone    4/26/2017 12:30 PM Cynthia Ibarra MD [facility] [phone]      Your Vitals Were     BP Pulse Temp(Src) Re Make half your plate fruits and vegetables Highly refined, white starches including white bread, rice and pasta   Eat plenty of protein, keep the fat content low Sugars:  sodas and sports drinks, candies and desserts   Eat plenty of low-fat dairy products

## (undated) NOTE — MR AVS SNAPSHOT
After Visit Summary   10/11/2019    Tri Manriquez    MRN: [id number]           Visit Information     Date & Time  10/11/2019  9:15 AM Provider  Farrah Henriquez MD Department  [facility] [address], [facility] [address] Dept.  Phone  [phone]      Your Imaging Scheduling Instructions     Around October 11, 2019   Imaging:   [facility] THAO 2D+3D SCREENING BILAT (JNS=[id number])    Instructions:   To schedule an appointment for your radiology test please call [facility] June Scheduling at [phone] headache and pink eye. The cost for a Video Visit is currently $35.         If you receive a survey from [employer], please take a few minutes to complete it and provide feedback.  We strive to deliver the best patient experience and are looking for ways visit: [email or web address] or call [phone]. ([phone])

## (undated) NOTE — MR AVS SNAPSHOT
After Visit Summary   11/20/2020    Gela Lozoya    MRN: [id number]           Visit Information     Date & Time  11/20/2020 10:30 AM Provider  Tima Ybarra MD [facility] [address], [facility] [place], [address] Dept[address]  Phone  [phone]      Your [facility] THAO 2D+3D SCREENING BILAT (CPT=77067/39860) [COMBO CPT(R)]  11/20/2020 (Approximate) 11/20/2021    THINPREP PAP SMEAR B [[id number] CUSTOM]  11/20/2020 11/20/2021      Imaging Scheduling Instructions     Around November 20, 2020   Imaging:   [facility] 2D [facility]  Monday – Friday  8:00 am – 8:00 pm   Saturday – Sunday  8:00 am – 4:00 pm    WALK-IN CARE  Primary Care Providers  Treatment for acute illness   or injury that are   non-life-threatening.   Also available by appointment

## (undated) NOTE — MR AVS SNAPSHOT
After Visit Summary   12/17/2021    Marcio Ramos   MRN: [id number]           Visit Information     Date & Time  12/17/2021 10:15 AM Provider  Agustin Feliz MD Department  [place] [address], [address] Dept.  Phone  [phone]      Your V 12/17/2021 (Approximate) 12/17/2022    THINPREP PAP SMEAR B [[id number] CUSTOM]  12/17/2021 12/17/2022      Imaging Scheduling Instructions     Around December 17, 2021   Imaging:   [facility] THAO 2D+3D SCREENING BILAT (KOV=[id number])    Instructions:  To schedule

## (undated) NOTE — MR AVS SNAPSHOT
[address] [place] [id number] [phone]               Thank you for choosing us for your health care visit with Andra Tam MD.  We are glad to serve you and happy to provide you with this summary Where to Get Your Medications      These medications were sent to [facility] [address], [place] [address] [phone], [phone]  [address] [facility]     Phone:  [phone]    - Levonorgest-Eth [address]-Day 0.15-0